# Patient Record
Sex: FEMALE | Race: WHITE | NOT HISPANIC OR LATINO | Employment: UNEMPLOYED | URBAN - METROPOLITAN AREA
[De-identification: names, ages, dates, MRNs, and addresses within clinical notes are randomized per-mention and may not be internally consistent; named-entity substitution may affect disease eponyms.]

---

## 2024-08-13 ENCOUNTER — OFFICE VISIT (OUTPATIENT)
Dept: FAMILY MEDICINE CLINIC | Facility: CLINIC | Age: 29
End: 2024-08-13
Payer: COMMERCIAL

## 2024-08-13 VITALS
TEMPERATURE: 98.2 F | HEIGHT: 66 IN | OXYGEN SATURATION: 98 % | BODY MASS INDEX: 34.55 KG/M2 | WEIGHT: 215 LBS | SYSTOLIC BLOOD PRESSURE: 120 MMHG | DIASTOLIC BLOOD PRESSURE: 72 MMHG | HEART RATE: 87 BPM

## 2024-08-13 DIAGNOSIS — Z3A.13 13 WEEKS GESTATION OF PREGNANCY: ICD-10-CM

## 2024-08-13 DIAGNOSIS — R79.89 ABNORMAL TSH: ICD-10-CM

## 2024-08-13 DIAGNOSIS — Z00.00 ENCOUNTER FOR WELL ADULT EXAM WITHOUT ABNORMAL FINDINGS: Primary | ICD-10-CM

## 2024-08-13 DIAGNOSIS — G89.29 CHRONIC LEFT HIP PAIN: ICD-10-CM

## 2024-08-13 DIAGNOSIS — M25.552 CHRONIC LEFT HIP PAIN: ICD-10-CM

## 2024-08-13 DIAGNOSIS — E55.9 VITAMIN D DEFICIENCY: ICD-10-CM

## 2024-08-13 PROCEDURE — 99385 PREV VISIT NEW AGE 18-39: CPT | Performed by: PHYSICIAN ASSISTANT

## 2024-08-13 NOTE — PROGRESS NOTES
"Dov Olmos is a 29 y.o. female.     History of Present Illness   Patient presents to hospitals care and annual physical.  is a patient of mine as well.     Currently pregnant about 13 weeks along   Having a little boy     Has 9 month old daughter right now   Took 2 years to conceive daughter      Gallbladder removed last year. Complication of pancreatitis.    Issues started with last pregnancy     Gestational diabetes and hyperemesis gravidarum with first pregnancy   No significant morning sickness with current pregnancy   Taking gummy prenatal     Hx of osgood schlatter   Having hip pain on left side, comes and goes over the past several years . Worse with laying down. Hurts worse when laying on side but having to due to current pregnancy. Tosses and turns a lot.    No previous work up   Went to chiropractor and notes pain for a couple days after adjustments     No PPD after daughter   Staying at home with daughter and not working right now   Working on buying a home       The following portions of the patient's history were reviewed and updated as appropriate: allergies, current medications, past family history, past medical history, past social history, past surgical history, and problem list.    Review of Systems  As noted per HPI     Objective   Blood pressure 120/72, pulse 87, temperature 98.2 °F (36.8 °C), height 167.6 cm (66\"), weight 97.5 kg (215 lb), SpO2 98%.   Physical Exam  Vitals and nursing note reviewed.   Constitutional:       Appearance: Normal appearance. She is well-developed.   HENT:      Head: Normocephalic and atraumatic.      Right Ear: External ear normal.      Left Ear: External ear normal.      Nose: Nose normal.      Mouth/Throat:      Pharynx: No oropharyngeal exudate.   Eyes:      Conjunctiva/sclera: Conjunctivae normal.   Neck:      Thyroid: No thyromegaly.      Trachea: No tracheal deviation.   Cardiovascular:      Rate and Rhythm: Normal rate and regular " rhythm.      Heart sounds: Normal heart sounds. No murmur heard.     No friction rub. No gallop.   Pulmonary:      Effort: Pulmonary effort is normal. No respiratory distress.      Breath sounds: Normal breath sounds. No wheezing or rales.   Chest:      Chest wall: No tenderness.   Abdominal:      Tenderness: There is no abdominal tenderness.   Musculoskeletal:      Cervical back: Normal range of motion and neck supple.      Comments: TTP along L piriformis muscle    Lymphadenopathy:      Cervical: No cervical adenopathy.   Skin:     General: Skin is warm and dry.   Neurological:      Mental Status: She is alert and oriented to person, place, and time.   Psychiatric:         Behavior: Behavior normal.         Thought Content: Thought content normal.         Judgment: Judgment normal.         Assessment & Plan   Diagnoses and all orders for this visit:    1. Encounter for well adult exam without abnormal findings (Primary)  -     TSH  -     T4, free  -     T3, free  -     Thyroid Antibodies  -     Comprehensive metabolic panel  -     Iron Profile  -     Vitamin B12  -     Folate  -     Vitamin D 25 hydroxy    2. Abnormal TSH  -     TSH  -     T4, free  -     T3, free  -     Thyroid Antibodies    3. Vitamin D deficiency  -     Vitamin D 25 hydroxy    4. 13 weeks gestation of pregnancy  -     Comprehensive metabolic panel  -     Iron Profile  -     Vitamin B12  -     Folate    5. Chronic left hip pain    Return for labs as outlined in plan   Offered PT for left hip/ piriformis pain likely exacerbated with current pregnancy. Pt declines at this time     Counseling was given to patient for the following topics: instructions for management, risk factor reductions, patient and family education, and impressions . Total time of the encounter was 15 minutes and 7.5 minutes was spent counseling.